# Patient Record
Sex: FEMALE | Race: WHITE
[De-identification: names, ages, dates, MRNs, and addresses within clinical notes are randomized per-mention and may not be internally consistent; named-entity substitution may affect disease eponyms.]

---

## 2018-04-11 ENCOUNTER — HOSPITAL ENCOUNTER (EMERGENCY)
Dept: HOSPITAL 17 - NEPK | Age: 23
Discharge: HOME | End: 2018-04-11
Payer: SELF-PAY

## 2018-04-11 VITALS
OXYGEN SATURATION: 100 % | TEMPERATURE: 99.7 F | DIASTOLIC BLOOD PRESSURE: 73 MMHG | RESPIRATION RATE: 17 BRPM | SYSTOLIC BLOOD PRESSURE: 129 MMHG | HEART RATE: 97 BPM

## 2018-04-11 VITALS — BODY MASS INDEX: 32.03 KG/M2 | HEIGHT: 60 IN | WEIGHT: 163.14 LBS

## 2018-04-11 DIAGNOSIS — J01.00: Primary | ICD-10-CM

## 2018-04-11 DIAGNOSIS — F32.9: ICD-10-CM

## 2018-04-11 DIAGNOSIS — H65.91: ICD-10-CM

## 2018-04-11 PROCEDURE — 99283 EMERGENCY DEPT VISIT LOW MDM: CPT

## 2018-04-11 NOTE — PD
HPI


Chief Complaint:  ENT Complaint


Time Seen by Provider:  18:43


Travel History


International Travel<30 days:  No


Contact w/Intl Traveler<30days:  No


Traveled to known affect area:  No





History of Present Illness


HPI


23-year-old  female presents emergency department with upper 

respiratory symptoms including, sore throat, right ear pain, sinus congestion, 

postnasal drip, and cough.  Patient denies significant shortness of breath or 

wheezing.  She is partially 1 year postpartum.  She is not breast-feeding.  She 

denies nausea, vomiting, or recent heartburn.  No diarrhea.  Pain in the ear is 

8 out of 10.  She has no drainage.  She has no difficulty swallowing.  No 

dental pain.  She has no known drug allergies per





[facility]


Past Medical History


Depression:  Yes


Tetanus Vaccination:  < 5 Years


Pregnant?:  Not Pregnant





Past Surgical History


Surgical History:  No Previous Surgery





Social History


Alcohol Use:  No


Tobacco Use:  No


Substance Use:  No





Allergies-Medications


(Allergen,Severity, Reaction):  


Coded Allergies:  


     No Known Allergies (Unverified , 4/11/18)


Reported Meds & Prescriptions





Reported Meds & Active Scripts


Active


Ibuprofen 600 Mg Tab 600 Mg PO Q6H PRN


Flonase Nasal Spray (Fluticasone Nasal Spray) 50 Mcg/Act Spray 100 Mcg EACH 

NARE BID


Amoxicillin 875 Mg Tab 875 Mg PO BID 10 Days








Review of Systems


Except as stated in HPI:  all other systems reviewed are Neg


General / Constitutional:  No: Fever


Eyes:  No: Visual changes


HENT:  Positive: Headaches, Sore Throat, Rhinitis, Rhinorrhea, Congestion, 

Earache, No: Vertigo, Lightheadedness, Nosebleed, Neck Stiffness, Neck Pain, 

Masses, Gingival Bleeding, Dental Difficulties, Ear Discharge


Cardiovascular:  No: Chest Pain or Discomfort


Respiratory:  No: Shortness of Breath


Gastrointestinal:  No: Abdominal Pain


Genitourinary:  No: Dysuria


Musculoskeletal:  No: Pain


Skin:  No Rash


Neurologic:  No: Weakness


Psychiatric:  No: Depression


Endocrine:  No: Polydipsia


Hematologic/Lymphatic:  No: Easy Bruising





Physical Exam


Narrative


GENERAL: Patient appears in mild to moderate distress.


SKIN: Warm and dry.  Normal color.  Normal turgor.  No rash.


HEAD: Atraumatic. Normocephalic.  Mild to moderate sinus tenderness in both 

frontal maxillary sinuses more on the right than the left.


EYES: Pupils equal and round. No scleral icterus. No injection or drainage. 


ENT: No nasal bleeding or discharge.  Mucous membranes pink and moist.  Right 

TM is dull, red, and bulging.  Left TM is dull.  Posterior pharynx is 

erythematous with cobblestoning and postnasal drip noted.  No significant 

tonsillitis or exudate is noted.


NECK: Trachea midline.  Supple with mild anterior tender lymphadenopathy


CARDIOVASCULAR: Regular rate and rhythm.  


RESPIRATORY: No accessory muscle use. Clear to auscultation. Breath sounds 

equal bilaterally. 


GASTROINTESTINAL: Abdomen soft, non-tender, nondistended. Hepatic and splenic 

margins not palpable. 


MUSCULOSKELETAL: Extremities without clubbing, cyanosis, or edema. No obvious 

deformities. 


NEUROLOGICAL: Awake and alert. No obvious cranial nerve deficits.  Motor 

grossly within normal limits. Five out of 5 muscle strength in the arms and 

legs.  Normal speech.


PSYCHIATRIC: Appropriate mood and affect; insight and judgment normal.





Data


Data


Last Documented VS





Vital Signs








  Date Time  Temp Pulse Resp B/P (MAP) Pulse Ox O2 Delivery O2 Flow Rate FiO2


 


4/11/18 18:37 99.7 97 17 129/73 (91) 100   











MDM


Medical Decision Making


Medical Screen Exam Complete:  Yes


Emergency Medical Condition:  Yes


Differential Diagnosis


Upper respiratory infection.  Otitis media.  Acute sinusitis.  Postnasal drip.  

Pharyngitis.


Narrative Course


Patient is given amoxicillin 875 twice daily 10 days.


Patient was given Flonase nasal spray 2 sprays daily.


Patient is given ibuprofen 600 mg 4 times daily #40.


Patient is to rest, push fluids, can use salt water gargles for sore throat, 

and follow-up as needed.





Diagnosis





 Primary Impression:  


 Acute sinusitis


 Qualified Codes:  J01.00 - Acute maxillary sinusitis, unspecified


 Additional Impression:  


 Right otitis media with effusion


Referrals:  


Primary Care Physician


Patient Instructions:  Ear Infection (ED), General Instructions, Sinusitis (ED)





***Additional Instructions:  


Patient is given amoxicillin 875 twice daily 10 days.


Patient was given Flonase nasal spray 2 sprays daily.


Patient is given ibuprofen 600 mg 4 times daily #40.


Patient is to rest, push fluids, can use salt water gargles for sore throat, 

and follow-up as needed.


***Med/Other Pt SpecificInfo:  Prescription(s) given


Scripts


Ibuprofen (Ibuprofen) 600 Mg Tab


600 MG PO Q6H Y for Pain/Inflammation, #40 TAB 0 Refills


   Prov: Jan Gibson MD         4/11/18 


Fluticasone Nasal Spray (Flonase Nasal Spray) 50 Mcg/Act Spray


100 MCG EACH NARE BID for Allergies, #1 BOTTLE 0 Refills


   Prov: Jan Gibson MD         4/11/18 


Amoxicillin (Amoxicillin) 875 Mg Tab


875 MG PO BID for Infection for 10 Days, #20 TAB 0 Refills


   Prov: Jan Gibson MD         4/11/18


Disposition:  01 DISCHARGE HOME


Condition:  Stable











Bernard Mao Apr 11, 2018 18:57